# Patient Record
Sex: MALE | Race: ASIAN | Employment: FULL TIME | URBAN - METROPOLITAN AREA
[De-identification: names, ages, dates, MRNs, and addresses within clinical notes are randomized per-mention and may not be internally consistent; named-entity substitution may affect disease eponyms.]

---

## 2019-04-15 ENCOUNTER — OFFICE VISIT (OUTPATIENT)
Dept: INTERNAL MEDICINE CLINIC | Age: 29
End: 2019-04-15

## 2019-04-15 VITALS
HEART RATE: 58 BPM | RESPIRATION RATE: 18 BRPM | BODY MASS INDEX: 30.93 KG/M2 | SYSTOLIC BLOOD PRESSURE: 110 MMHG | DIASTOLIC BLOOD PRESSURE: 56 MMHG | HEIGHT: 74 IN | WEIGHT: 241 LBS | OXYGEN SATURATION: 99 % | TEMPERATURE: 98.1 F

## 2019-04-15 DIAGNOSIS — R42 DIZZINESS: ICD-10-CM

## 2019-04-15 DIAGNOSIS — R19.6 HALITOSIS: Primary | ICD-10-CM

## 2019-04-15 NOTE — PATIENT INSTRUCTIONS
Bad Breath (Halitosis): Care Instructions  Your Care Instructions    Everybody has bad breath from time to time, especially first thing in the morning. Saliva has a cleaning action that helps reduce or get rid of bad breath. When you have less saliva, bacteria can grow, causing bad breath. The flow of saliva almost stops during sleep. Many other things can cause bad breath, such as missing meals, being dehydrated, or eating foods with a strong odor, such as garlic. Other causes include throat or mouth infections (such as strep throat), dental problems (such as cavities), and gum disease. Bad breath can also be caused by medical problems, such as kidney disease. Follow-up care is a key part of your treatment and safety. Be sure to make and go to all appointments, and call your doctor if you are having problems. It's also a good idea to know your test results and keep a list of the medicines you take. How can you care for yourself at home? Mouth care  · Gargle with water. · Floss your teeth once each day. · Use a mouthwash for temporary relief of bad breath. Swish it around in your mouth for 30 seconds before spitting it out. · Brush your teeth, tongue, roof of the mouth, and gums at least twice a day with toothpaste. · Remove dentures, removable bridges, partial plates, or orthodontic appliances and clean them once each day or as directed by your dentist. Pieces of food and germs can collect on these appliances and cause bad breath. Healthy lifestyle  · Eat a low-fat diet rich in fruits and vegetables. · Eat less meat. · Don't smoke or use other tobacco products, such as snuff or chewing (spit) tobacco.  · Avoid foods and drinks that cause bad breath, such as garlic and alcohol. · Eat at regular intervals. Dieting or missing meals can decrease saliva and cause bad breath. · Chew sugar-free gum, suck on sugar-free mints, or drink water, especially if your mouth is dry.  Try using breath sticks, which contain the ingredients found in a mouthwash and dissolve in your mouth. Doctor visits  · Have regular dental checkups. · Make an appointment to see an ear, nose, and throat specialist (otolaryngologist) if you have frequent problems with mouth odor. When should you call for help? Watch closely for changes in your health, and be sure to contact your doctor if you have any problems. Where can you learn more? Go to http://melany-xiao.info/. Enter [id number] in the search box to learn more about \"Bad Breath (Halitosis): Care Instructions. \"  Current as of: March 27, 2018  Content Version: 11.9  © 6302-9789 [employer]. Care instructions adapted under license by [employer] (which disclaims liability or warranty for this information). If you have questions about a medical condition or this instruction, always ask your healthcare professional. [address] any warranty or liability for your use of this information.

## 2019-04-15 NOTE — PROGRESS NOTES
HISTORY OF PRESENT ILLNESS  Armando Thurston is a 29 y.o. male presents to [facility] care. [employer]  ID #  [id number] assists   HPI   He notes no chronic medical condition    Bad breath for ~4 months. No change in diet or medication       ED visit 2 months ago for bad breath and tingling in legs unsure of which ED. Labs normal    Brushs and gargles daily, Saw dentist and had  cleaning a few weeks ago. needs 2 teeth filled, dentist does not believe this is cause of bad breath    Not brushing tongue or flossing     In [place] he had surgery for nasal blockage. Notes he did not have bad breath then     No GI problem except intermittent abdominal bloating    Light headed for a long time 4-5 years     Tired and fatigue still doing exercises     Drinks  3-4 glasses tea during day    During the summer 4-5 bottle of  Water and tea    Completed treatment  for latent TB      Psychosocial hx:  Migrated from [place] 2 years afo    Works as [employer]      2 children  No depression or anxiety  No tobacco or alcohol     History reviewed. No pertinent past medical history. No current outpatient medications on file. No current facility-administered medications for this visit. Family History   Problem Relation Age of Onset    Heart Disease Mother 39    Hypertension Mother     High Cholesterol Mother     No Known Problems Father          Review of Systems   All other systems reviewed and are negative. Visit Vitals  /56 (BP 1 Location: Left arm, BP Patient Position: Sitting)   Pulse (!) 58   Temp 98.1 °F (36.7 °C) (Oral)   Resp 18   Ht 6' 2.49\" (1.892 m)   Wt 241 lb (109.3 kg)   SpO2 99%   BMI 30.54 kg/m²     Physical Exam   Constitutional: He is oriented to person, place, and time. He appears well-developed and well-nourished. No distress. HENT:   Head: Normocephalic and atraumatic.    Right Ear: External ear normal.   Left Ear: External ear normal.   Nose: Nose normal. Mouth/Throat: Oropharynx is clear and moist. No oropharyngeal exudate. No appreciable halitosis   Eyes: Pupils are equal, round, and reactive to light. Conjunctivae and EOM are normal. Right eye exhibits no discharge. Left eye exhibits no discharge. No scleral icterus. Cardiovascular: Normal rate, regular rhythm and normal heart sounds. Pulmonary/Chest: Effort normal and breath sounds normal.   Abdominal: Soft. Bowel sounds are normal.   Musculoskeletal: He exhibits no edema, tenderness or deformity. Lymphadenopathy:     He has no cervical adenopathy. Neurological: He is alert and oriented to person, place, and time. Skin: Skin is warm and dry. He is not diaphoretic. Psychiatric: He has a normal mood and affect. His behavior is normal. Judgment and thought content normal.       ASSESSMENT and PLAN    ICD-10-CM ICD-9-CM    1. Halitosis R19.6 784.99 REFERRAL TO ENT-OTOLARYNGOLOGY   2. Dizziness R42 780.4      Follow-up and Dispositions    · Return in about 1 month (around 5/13/2019) for haitosis. reviewed diet, exercise and weight control  reviewed medications and side effects in detail    Discussed indications for ENT evaluation   Encouraged to floss,  brush tongue and roof of mouth, gargle with water or mouth was    Patient voices understanding and acceptance of this advice and will call back if any further questions or concerns. An After Visit Summary was printed and given to the patient.

## 2019-04-15 NOTE — PROGRESS NOTES
Exam room 9  Ember Sorenson is a 29 y.o. male   Interpretation phones needed  [employer] Language # [id number]- Ghafor  Pt [place]  Pt states he was being treated for TB and was retested and all results came out clear  Pt states he had surgery on forehead to remove some type of cyst back home in [place]  Pt is concerned about the odor he has coming from his mouth    Chief Complaint   Patient presents with    Establish Care    Other     halitosis    Labs     Visit Vitals  /56 (BP 1 Location: Left arm, BP Patient Position: Sitting)   Pulse (!) 58   Temp 98.1 °F (36.7 °C) (Oral)   Resp 18   Ht 6' 2.49\" (1.892 m)   Wt (P) 235 lb (106.6 kg)   SpO2 99%   BMI (P) 29.78 kg/m²     1. Have you been to the ER, urgent care clinic since your last visit? Hospitalized since your last visit? No    2. Have you seen or consulted any other health care providers outside of the [address] [place] [address] since your last visit? Include any pap smears or colon screening.  No  Health Maintenance Due   Topic Date Due    DTaP/Tdap/Td series (1 - Tdap) 10/19/2011     Learning Assessment 4/15/2019   PRIMARY LEARNER Patient   PRIMARY LANGUAGE OTHER (COMMENT)   LEARNER PREFERENCE PRIMARY DEMONSTRATION   ANSWERED BY patient   RELATIONSHIP SELF

## 2022-08-09 ENCOUNTER — OFFICE VISIT (OUTPATIENT)
Dept: ORTHOPEDIC SURGERY | Age: 32
End: 2022-08-09
Payer: COMMERCIAL

## 2022-08-09 VITALS — BODY MASS INDEX: 29.97 KG/M2 | WEIGHT: 241 LBS | HEIGHT: 75 IN

## 2022-08-09 DIAGNOSIS — M51.36 BULGE OF LUMBAR DISC WITHOUT MYELOPATHY: ICD-10-CM

## 2022-08-09 DIAGNOSIS — M54.16 LUMBAR RADICULITIS: ICD-10-CM

## 2022-08-09 DIAGNOSIS — M54.50 LUMBAR BACK PAIN: Primary | ICD-10-CM

## 2022-08-09 PROCEDURE — 99214 OFFICE O/P EST MOD 30 MIN: CPT | Performed by: PHYSICIAN ASSISTANT

## 2022-08-09 NOTE — PROGRESS NOTES
Migdalia Scott (: 1990) is a 32 y.o. male, established  patient, here for evaluation of the following chief complaint(s):  Back Pain         SUBJECTIVE/OBJECTIVE:    Jamir Sosa (: 1990) is a 32 y.o. male who presents for evaluation of lumbar back pain. The patient is from [place] and speaks very little [place]. The patient was able to contact his wife by phone who acts as a  today. The patient states, through his wife he has been experiencing back pain for about 1 year. The patient denies any history of trauma. Patient describes aching, burning and stabbing pain in the bilateral lower lumbar region. Patient states symptoms intermittently radiate to the left or the right leg. Patient states symptoms are worse with standing, bending and lifting. Patient states he had several weeks of outpatient physical therapy a few months back which did not offer any lasting benefit. Patient has been taking Advil and Tylenol on an as-needed basis and states his pain level with activity is rated as a 10/10. Patient states his pain is severe at nighttime and he is unable to sleep. Given failure of conservative management in the past patient presents today for further evaluation. The patient denies saddle paraesthesias/ anaesthesia. No flowsheet data found. ROS    The patient denies fevers, chills, chest pain, shortness of breath, nausea, vomiting. Positive for musculoskeletal issues as described in the HPI. Vitals:  Ht 6' 2.5\" (1.892 m)   Wt 241 lb (109.3 kg)   BMI 30.53 kg/m²    Body mass index is 30.53 kg/m². PHYSICAL EXAM:    The patient is alert and oriented x3 and in no acute distress. Patient ambulates with a normal gait without gait aids. Sensory testing in all major nerve distributions in the lower extremities is intact and symmetric.  Manual motor testing of the major muscle groups of the lower extremities including dorsiflexion/EHL/ plantarflexion, knee flexion/extension, hip flexion/extension/abduction/ adduction is intact and symmetric in the bilateral lower extremities. Deep tendon reflexes +2/4 and symmetric in the bilateral knees and ankles. Hip range of motion is pain-free bilaterally. Negative straight leg raise on the right and is considered positive on the left. No evidence of clonus bilaterally. Babinski's downgoing and symmetric bilaterally. Distal pulses intact and symmetric bilaterally. There is tenderness to palpation of the midline and bilateral lower lumbar and sacral regions. IMAGING:    XR Results (most recent):  Results from Appointment encounter on 08/09/22    XR SPINE LUMB MIN 4 V    Narrative  AP, lateral, flexion and extension view digital radiographs of the lumbar spine obtained in the office today were reviewed and demonstrate good preservation of vertebral body height and intervertebral disc height. There are no gross motion segments seen on flexion/extension views. Overall lumbar alignment is lordotic. There is no evidence of fracture, lytic lesion or acute bony abnormality. MRI Results (most recent):  No results found for this or any previous visit. No Known Allergies      No current outpatient medications on file. No current facility-administered medications for this visit. No past medical history on file. Past Surgical History:   Procedure Laterality Date    HX HEENT  2016    to correct nasal blockage, in [facility]          Family History   Problem Relation Age of Onset    Heart Disease Mother 39    Hypertension Mother     High Cholesterol Mother     No Known Problems Father           Social History     Tobacco Use    Smoking status: Never    Smokeless tobacco: Never   Substance Use Topics    Alcohol use: Never    Drug use: Never                 ASSESSMENT/PLAN:      1. Lumbar back pain  -     XR SPINE LUMB MIN 4 V; Future  2. Lumbar radiculitis  3.  Bulge of lumbar disc without myelopathy      Below is the assessment and plan developed based on review of pertinent history, physical exam, labs, studies, and medications. Have discussed the diagnosis and radiographic findings at length and have answered all patient questions to his satisfaction. Given the patient's ongoing pain despite conservative management have referred the patient for MRI lumbar to assess for stenosis vs disc herniation. Will plan on seeing the patient back for reevaluation and further treatment recommendations once imaging results are available for review. The patient understands and agrees to the treatment plan as outlined above. Return in about 4 weeks (around 9/6/2022) for MRI review. Dr. Bridgette Mazariegos was available for immediate consult during this encounter. An electronic signature was used to authenticate this note.     -- Alicia Harris PA-C

## 2022-09-09 ENCOUNTER — HOSPITAL ENCOUNTER (OUTPATIENT)
Dept: MRI IMAGING | Age: 32
Discharge: HOME | End: 2022-09-09
Attending: PHYSICIAN ASSISTANT
Payer: COMMERCIAL

## 2022-09-09 DIAGNOSIS — M51.36 BULGE OF LUMBAR DISC WITHOUT MYELOPATHY: ICD-10-CM

## 2022-09-09 DIAGNOSIS — M54.16 LUMBAR RADICULITIS: ICD-10-CM

## 2022-09-09 DIAGNOSIS — M54.50 LUMBAR BACK PAIN: ICD-10-CM

## 2022-09-09 PROCEDURE — 72148 MRI LUMBAR SPINE W/O DYE: CPT
